# Patient Record
Sex: FEMALE | Race: BLACK OR AFRICAN AMERICAN | NOT HISPANIC OR LATINO | URBAN - METROPOLITAN AREA
[De-identification: names, ages, dates, MRNs, and addresses within clinical notes are randomized per-mention and may not be internally consistent; named-entity substitution may affect disease eponyms.]

---

## 2021-01-20 ENCOUNTER — TELEPHONE ENCOUNTER (OUTPATIENT)
Dept: URBAN - METROPOLITAN AREA CLINIC 92 | Facility: CLINIC | Age: 61
End: 2021-01-20

## 2021-01-20 ENCOUNTER — DASHBOARD ENCOUNTERS (OUTPATIENT)
Age: 61
End: 2021-01-20

## 2021-01-20 ENCOUNTER — OFFICE VISIT (OUTPATIENT)
Dept: URBAN - METROPOLITAN AREA CLINIC 92 | Facility: CLINIC | Age: 61
End: 2021-01-20
Payer: COMMERCIAL

## 2021-01-20 VITALS
WEIGHT: 198 LBS | TEMPERATURE: 95 F | HEIGHT: 67 IN | DIASTOLIC BLOOD PRESSURE: 89 MMHG | HEART RATE: 64 BPM | BODY MASS INDEX: 31.08 KG/M2 | SYSTOLIC BLOOD PRESSURE: 138 MMHG

## 2021-01-20 DIAGNOSIS — R06.00 DYSPNEA, UNSPECIFIED TYPE: ICD-10-CM

## 2021-01-20 DIAGNOSIS — R12 HEARTBURN: ICD-10-CM

## 2021-01-20 PROCEDURE — G8482 FLU IMMUNIZE ORDER/ADMIN: HCPCS | Performed by: INTERNAL MEDICINE

## 2021-01-20 PROCEDURE — 99204 OFFICE O/P NEW MOD 45 MIN: CPT | Performed by: INTERNAL MEDICINE

## 2021-01-20 PROCEDURE — 99244 OFF/OP CNSLTJ NEW/EST MOD 40: CPT | Performed by: INTERNAL MEDICINE

## 2021-01-20 RX ORDER — FAMOTIDINE 40 MG/1
1 TABLET AT BEDTIME TABLET, FILM COATED ORAL ONCE A DAY
Qty: 90 | Refills: 3 | OUTPATIENT
Start: 2021-01-20

## 2021-01-20 RX ORDER — OMEPRAZOLE 40 MG/1
1 CAPSULE 30 MIN BEFORE MEALS CAPSULE, DELAYED RELEASE ORAL TWICE A DAY
Qty: 180 CAPSULE | Refills: 3 | OUTPATIENT
Start: 2021-01-20

## 2021-01-20 NOTE — HPI-TODAY'S VISIT:
The patient was referred by Dr. Alin Viramontes for  GERD. A copy of this document is being forwarded to the referring provider. She notes 6+ months of heartburn, sore throat, a feeling of acid in her chest and dyspnea with associated chest tightness. She was admitted to [facility] 12/2020 with SOB, wheezing and cough. Dx with Acute on Chronic Bronchitis s/p steroids that helped. Sees [facility] and neg PFTs 12/2020.  EKG with no ischemic findings. Normal cardiac PET 8/15/20. COVID/CXR Neg.  She notes GERD sx X 6m. Saw Dr. Lopes and had EGD 9/2020 that was normal. +FLIP with EGJOO but botox not given as no dysphagia. She was given BID protonix without change in sx. Coker suggested at that time but patient declined.  Also tums has not helped. Omeprazole helped the most in the past. Colon 10/2017 Dr. Abel at [facility] showed IH ow normal. EGD 2017 no esophagitis.  Very anxious today as "I cant breath"

## 2022-02-25 ENCOUNTER — ERX REFILL RESPONSE (OUTPATIENT)
Dept: URBAN - METROPOLITAN AREA CLINIC 92 | Facility: CLINIC | Age: 62
End: 2022-02-25

## 2022-02-25 RX ORDER — OMEPRAZOLE 40 MG/1
TAKE 1 CAPSULE BY MOUTH 30 MIN BEFORE MEALS TWICE A DAY CAPSULE, DELAYED RELEASE ORAL
Qty: 180 CAPSULE | Refills: 1 | OUTPATIENT

## 2022-02-25 RX ORDER — OMEPRAZOLE 40 MG/1
1 CAPSULE 30 MIN BEFORE MEALS CAPSULE, DELAYED RELEASE ORAL TWICE A DAY
Qty: 180 CAPSULE | Refills: 3 | OUTPATIENT